# Patient Record
Sex: FEMALE | Race: ASIAN | Employment: STUDENT | URBAN - METROPOLITAN AREA
[De-identification: names, ages, dates, MRNs, and addresses within clinical notes are randomized per-mention and may not be internally consistent; named-entity substitution may affect disease eponyms.]

---

## 2018-06-06 ENCOUNTER — TRANSFERRED RECORDS (OUTPATIENT)
Dept: HEALTH INFORMATION MANAGEMENT | Facility: CLINIC | Age: 18
End: 2018-06-06

## 2018-06-06 LAB
C TRACH DNA SPEC QL PROBE+SIG AMP: NORMAL
N GONORRHOEA DNA SPEC QL PROBE+SIG AMP: NORMAL
SPECIMEN DESCRIP: NORMAL
SPECIMEN DESCRIPTION: NORMAL

## 2018-08-16 ENCOUNTER — OFFICE VISIT (OUTPATIENT)
Dept: FAMILY MEDICINE | Facility: CLINIC | Age: 18
End: 2018-08-16
Payer: COMMERCIAL

## 2018-08-16 VITALS
SYSTOLIC BLOOD PRESSURE: 102 MMHG | HEART RATE: 80 BPM | OXYGEN SATURATION: 99 % | WEIGHT: 110 LBS | HEIGHT: 61 IN | TEMPERATURE: 99.3 F | DIASTOLIC BLOOD PRESSURE: 70 MMHG | RESPIRATION RATE: 14 BRPM | BODY MASS INDEX: 20.77 KG/M2

## 2018-08-16 DIAGNOSIS — Z30.011 ENCOUNTER FOR BCP (BIRTH CONTROL PILLS) INITIAL PRESCRIPTION: Primary | ICD-10-CM

## 2018-08-16 PROCEDURE — 99203 OFFICE O/P NEW LOW 30 MIN: CPT | Performed by: INTERNAL MEDICINE

## 2018-08-16 RX ORDER — CETIRIZINE HYDROCHLORIDE 10 MG/1
10 TABLET ORAL
COMMUNITY
Start: 2018-06-06

## 2018-08-16 RX ORDER — NORETHINDRONE ACETATE AND ETHINYL ESTRADIOL 1MG-20(21)
1 KIT ORAL DAILY
Qty: 84 TABLET | Refills: 3 | Status: SHIPPED | OUTPATIENT
Start: 2018-08-16 | End: 2019-07-19

## 2018-08-16 NOTE — MR AVS SNAPSHOT
"              After Visit Summary   8/16/2018    Lázaro Nelson    MRN: [id number]           Patient Information     Date Of Birth          2000        Visit Information        Provider Department      8/16/2018 5:20 PM Cnidy Villa MD [facility] [place]        Today's Diagnoses     Encounter for BCP (birth control pills) initial prescription    -  1    Screening examination for venereal disease        Screening for HIV (human immunodeficiency virus)        Need for HPV vaccine        Need for prophylactic vaccination with tetanus-diphtheria (TD)           Follow-ups after your visit        Follow-up notes from your care team     Return in about 1 year (around 8/16/2019) for Physical Exam.      Who to contact     If you have questions or need follow up information about today's clinic visit or your schedule please contact [facility] [place] directly at [phone].  Normal or non-critical lab and imaging results will be communicated to you by MyChart, letter or phone within 4 business days after the clinic has received the results. If you do not hear from us within 7 days, please contact the clinic through MyChart or phone. If you have a critical or abnormal lab result, we will notify you by phone as soon as possible.  Submit refill requests through [employer] or call your pharmacy and they will forward the refill request to us. Please allow 3 business days for your refill to be completed.          Additional Information About Your Visit        MyChart Information     [employer] lets you send messages to your doctor, view your test results, renew your prescriptions, schedule appointments and more. To sign up, go to [email or web address] . Click on \"Log in\" on the left side of the screen, which will take you to the Welcome page. Then click on \"Sign up Now\" on the right side of the page.     You will be asked to enter the access code listed below, as well as some personal information. " "Please follow the directions to create your username and password.     Your access code is: [email or web address]  Expires: 2018  5:11 PM     Your access code will  in 90 days. If you need help or a new code, please call your [facility] clinic or [phone].        Care EveryWhere ID     This is your Care EveryWhere ID. This could be used by other organizations to access your [facility] medical records  [id number]        Your Vitals Were     Pulse Temperature Respirations Height Last Period Pulse Oximetry    80 99.3  F (37.4  C) (Tympanic) 14 5' 1\" (1.549 m) 2018 (Exact Date) 99%    BMI (Body Mass Index)                   20.78 kg/m2            Blood Pressure from Last 3 Encounters:   18 102/70    Weight from Last 3 Encounters:   18 110 lb (49.9 kg)              Today, you had the following     No orders found for display         Today's Medication Changes          These changes are accurate as of 18  5:30 PM.  If you have any questions, ask your nurse or doctor.               Start taking these medicines.        Dose/Directions    norethindrone-ethinyl estradiol 1-20 MG-MCG per tablet   Commonly known as:  JUNEL FE 1/20   Used for:  Encounter for BCP (birth control pills) initial prescription   Started by:  Cindy Villa MD        Dose:  1 tablet   Take 1 tablet by mouth daily   Quantity:  84 tablet   Refills:  3            Where to get your medicines      These medications were sent to [id number] - [place], [place] - [address] AT [facility] [place] [facility] [address]  [address], [place] [address]    Hours:  24-hours Phone:  [phone]     norethindrone-ethinyl estradiol 1-20 MG-MCG per tablet                Primary Care Provider Fax #    Physician No Ref-Primary [phone]       No address on file        Equal Access to Services     RON RUTHERFORD AH: Coretta doniso Soomaali, waaxda luqadaha, qaybta kaalmada aditya, jensen napoles " aric raya ah. So [facility] [phone].    ATENCIÓN: Si habla kimberly, tiene a brown disposición servicios gratuitos de asistencia lingüística. Marshal al [phone].    We comply with applicable federal civil rights laws and [place] laws. We do not discriminate on the basis of race, color, national origin, age, disability, sex, sexual orientation, or gender identity.            Thank you!     Thank you for choosing [facility] [place]  for your care. Our goal is always to provide you with excellent care. Hearing back from our patients is one way we can continue to improve our services. Please take a few minutes to complete the written survey that you may receive in the mail after your visit with us. Thank you!             Your Updated Medication List - Protect others around you: Learn how to safely use, store and throw away your medicines at www.disposemymeds.org.          This list is accurate as of 8/16/18  5:30 PM.  Always use your most recent med list.                   Brand Name Dispense Instructions for use Diagnosis    cetirizine 10 MG tablet    zyrTEC     Take 10 mg by mouth        norethindrone-ethinyl estradiol 1-20 MG-MCG per tablet    JUNEL FE 1/20    84 tablet    Take 1 tablet by mouth daily    Encounter for BCP (birth control pills) initial prescription

## 2018-08-16 NOTE — PROGRESS NOTES
"  SUBJECTIVE:   Lázaro Nelson is a 18 year old female who presents to clinic today for the following health issues:      Contracepton        Description (location/character/radiation): Pt would like to discuss contraception options.        Lázaro birth control.  She is interested in the pill.  She feels like she would not have trouble remembering to take it every day.  She is currently sexually active, using condoms.  She had STD screening 2 months ago at [facility] which was negative.  She is having regular periods.     She has no history of migraines, she does not smoke, her blood pressure is normal.  There is no known personal or family history of VTE, heart attack or stroke.    She is heading back to college next week.  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  She is otherwise healthy.  Takes cetirizine for allergies.    Reviewed and updated as needed this visit by clinical staff  Tobacco  Meds  Soc Hx      Reviewed and updated as needed this visit by Provider  Meds         ROS:  Cv, neuro, gu reviewed,  otherwise negative unless noted above.       OBJECTIVE:     /70 (Cuff Size: Adult Regular)  Pulse 80  Temp 99.3  F (37.4  C) (Tympanic)  Resp 14  Ht 5' 1\" (1.549 m)  Wt 110 lb (49.9 kg)  LMP 07/26/2018 (Exact Date)  SpO2 99%  BMI 20.78 kg/m2  Body mass index is 20.78 kg/(m^2).    GENERAL: healthy, alert and no distress  PSYCH: mentation appears normal, affect normal/bright        ASSESSMENT/PLAN:       1. Encounter for BCP (birth control pills) initial prescription  Discussed options of nuvaring, depo shot, and nexplanon as well.  She would like to do OCP.  Reviewed common side effects.  Stressed that condoms are still important to prevent STDs, OCP only prevents pregnancy.  Effectiveness declines with inconsistency.  Recommended she start at her next menstrual cycle.  - norethindrone-ethinyl estradiol (JUNEL FE 1/20) 1-20 MG-MCG per tablet; Take 1 tablet by mouth daily  Dispense: 84 tablet; " Refill: 3    F/U - annually, sooner as needed    Cindy Villa MD  [facility]

## 2018-09-24 ENCOUNTER — NURSE TRIAGE (OUTPATIENT)
Dept: NURSING | Facility: CLINIC | Age: 18
End: 2018-09-24

## 2018-09-24 NOTE — TELEPHONE ENCOUNTER
"Patient completed her first pack of birth control pills.  She has bloating. She is sexually active and they've used back up contraceptive to cover while the pills take effect.  She is going to go to the school clinic for an evaluation to make sure she's not pregnant and any further guidance. She didn't have regular flow just spotting.  Yissel Curtis RN-[facility]      Additional Information    Negative: [1] Vaginal bleeding AND [2] not on birth control pills    Negative: Vaginal discharge (other than bleeding) is main symptom    Negative: [1] SEVERE pain (e.g., excruciating) AND [2] present > 1 hour    Negative: [1] Pregnant AND [2] MODERATE-SEVERE abdominal pain    Negative: [1] Pregnant AND [2] MODERATE-SEVERE vaginal bleeding    Negative: SEVERE vaginal bleeding (i.e., soaking 2 pads or tampons per hour and present 2 or more hours)    Negative: Patient sounds very sick or weak to the triager    Negative: [1] Constant abdominal pain AND [2] present > 2 hours    Negative: DVT suspected (teen with unilateral painful thigh or calf AND edema distal to pain)    Negative: [1] Pregnant AND [2] MILD symptoms (e.g., vaginal spotting, mild abdominal cramping)    Negative: Caller has urgent question and triager unable to answer question    Negative: MODERATE vaginal bleeding (i.e., soaking 1 pad or tampon per hour and present > 6 hours)    Negative: Caller requests refill for birth control pills    Negative: Caller has NON-URGENT question and triager unable to answer question    Negative: [1] Recent unprotected sex (within last 5 days) AND [2] history of missed pills/inconsistent pill use    Negative: [1] Pregnant AND [2] NO vaginal bleeding or abdominal pain    Negative: [1] Vaginal bleeding with > 6 soaked pads or tampons per day AND [2] lasts > 7 days    Negative: Caller is concerned about a sexually transmitted infection (STI)    Negative: Missed 3 or more \"active\" pills in a cycle    Negative: [1] " Vaginal bleeding with > 6 soaked pads or tampons per day BUT [2] lasts 7 days or less    Negative: Vaginal bleeding lasts > 7 days    Negative: [1] No monthly bleeding AND [2] stopped taking birth control pills 6 or more months ago    Negative: [1] Bleeding or spotting between regular periods AND [2] continues more than 3 months on birth control pills    Negative: [1] No monthly bleeding AND [2] taking progestin-only birth control pills (all triage questions negative)    Negative: [1] No monthly bleeding (or irregular periods) AND [2] stopped taking birth control pills < 6 months ago (all triage questions negative)    Negative: [1] Irregular vaginal bleeding or spotting AND [2] taking birth control pills with NO missed doses (all triage questions negative)    Negative: [1] Irregular vaginal bleeding or spotting AND [2] taking birth control pills and has missed doses (all triage questions negative)    Negative: Missed 1 or more pills, questions about (all triage questions negative)    Birth control pills, questions about    Protocols used: CONTRACEPTION - BIRTH CONTROL PILLS-PEDIATRIC-

## 2019-04-22 ENCOUNTER — ALLIED HEALTH/NURSE VISIT (OUTPATIENT)
Dept: NURSING | Facility: CLINIC | Age: 19
End: 2019-04-22
Payer: COMMERCIAL

## 2019-04-22 DIAGNOSIS — Z23 NEED FOR TDAP VACCINATION: Primary | ICD-10-CM

## 2019-04-22 PROCEDURE — 99207 ZZC NO CHARGE NURSE ONLY: CPT

## 2019-04-22 PROCEDURE — 90471 IMMUNIZATION ADMIN: CPT

## 2019-04-22 PROCEDURE — 90715 TDAP VACCINE 7 YRS/> IM: CPT

## 2019-05-16 ENCOUNTER — HEALTH MAINTENANCE LETTER (OUTPATIENT)
Age: 19
End: 2019-05-16

## 2019-07-15 ENCOUNTER — ALLIED HEALTH/NURSE VISIT (OUTPATIENT)
Dept: NURSING | Facility: CLINIC | Age: 19
End: 2019-07-15
Payer: COMMERCIAL

## 2019-07-15 DIAGNOSIS — Z11.1 VISIT FOR MANTOUX TEST: Primary | ICD-10-CM

## 2019-07-15 PROCEDURE — 86580 TB INTRADERMAL TEST: CPT

## 2019-07-15 NOTE — PROGRESS NOTES

## 2019-07-17 ENCOUNTER — ALLIED HEALTH/NURSE VISIT (OUTPATIENT)
Dept: NURSING | Facility: CLINIC | Age: 19
End: 2019-07-17
Payer: COMMERCIAL

## 2019-07-17 DIAGNOSIS — Z11.1 SCREENING EXAMINATION FOR PULMONARY TUBERCULOSIS: Primary | ICD-10-CM

## 2019-07-17 LAB
PPDINDURATION: 0 MM (ref 0–5)
PPDREDNESS: 0 MM

## 2019-07-17 PROCEDURE — 99207 ZZC NO CHARGE NURSE ONLY: CPT

## 2019-07-22 ENCOUNTER — ALLIED HEALTH/NURSE VISIT (OUTPATIENT)
Dept: NURSING | Facility: CLINIC | Age: 19
End: 2019-07-22
Payer: COMMERCIAL

## 2019-07-22 DIAGNOSIS — Z11.1 VISIT FOR MANTOUX TEST: Primary | ICD-10-CM

## 2019-07-22 PROCEDURE — 86580 TB INTRADERMAL TEST: CPT

## 2019-07-22 NOTE — PROGRESS NOTES
The patient is asked the following questions today and these are her answers:    -Have you had a mantoux administered in the past 30 days?    Yes  -Have you had a previous positive Mantoux.  No  -Have you received BCG in the past.  No  -Have you had a live vaccine  (MMR, Varicella, OPV, Yellow Fever) in the last 6 weeks.  No  -Have you had and active  viral or bacterial infection in the past 6 weeks.  No  -Have you received corticosteroids or immunosuppressive agents in the past 6 weeks.  No  -Have you been diagnosed with HIV?  No  -Do you have a malignancy?  No    Mantoux Questionnaire: answers were all negative.    Lisa Silva CMA

## 2019-07-24 ENCOUNTER — OFFICE VISIT (OUTPATIENT)
Dept: FAMILY MEDICINE | Facility: CLINIC | Age: 19
End: 2019-07-24
Payer: COMMERCIAL

## 2019-07-24 ENCOUNTER — ALLIED HEALTH/NURSE VISIT (OUTPATIENT)
Dept: NURSING | Facility: CLINIC | Age: 19
End: 2019-07-24
Payer: COMMERCIAL

## 2019-07-24 VITALS
HEART RATE: 71 BPM | HEIGHT: 61 IN | WEIGHT: 114 LBS | SYSTOLIC BLOOD PRESSURE: 102 MMHG | BODY MASS INDEX: 21.52 KG/M2 | TEMPERATURE: 97.9 F | DIASTOLIC BLOOD PRESSURE: 72 MMHG

## 2019-07-24 DIAGNOSIS — Z00.00 ROUTINE GENERAL MEDICAL EXAMINATION AT A HEALTH CARE FACILITY: ICD-10-CM

## 2019-07-24 DIAGNOSIS — Z11.1 SCREENING EXAMINATION FOR PULMONARY TUBERCULOSIS: Primary | ICD-10-CM

## 2019-07-24 DIAGNOSIS — Z12.4 SCREENING FOR MALIGNANT NEOPLASM OF CERVIX: ICD-10-CM

## 2019-07-24 PROBLEM — Z30.41 USES ORAL CONTRACEPTION: Status: ACTIVE | Noted: 2019-07-24

## 2019-07-24 PROBLEM — R71.8 MICROCYTOSIS: Status: ACTIVE | Noted: 2018-06-06

## 2019-07-24 LAB
PPDINDURATION: 0 MM (ref 0–5)
PPDREDNESS: 0 MM

## 2019-07-24 PROCEDURE — 99207 ZZC NO CHARGE NURSE ONLY: CPT

## 2019-07-24 PROCEDURE — G0123 SCREEN CERV/VAG THIN LAYER: HCPCS | Performed by: INTERNAL MEDICINE

## 2019-07-24 PROCEDURE — 99395 PREV VISIT EST AGE 18-39: CPT | Performed by: INTERNAL MEDICINE

## 2019-07-24 RX ORDER — NORETHINDRONE ACETATE AND ETHINYL ESTRADIOL 1MG-20(21)
1 KIT ORAL DAILY
Qty: 84 TABLET | Refills: 4 | Status: SHIPPED | OUTPATIENT
Start: 2019-07-24 | End: 2020-08-17

## 2019-07-24 ASSESSMENT — MIFFLIN-ST. JEOR: SCORE: 1219.48

## 2019-07-24 NOTE — LETTER
July 30, 2019      Lázaro Nelson  [address]  [place] [address]    Dear ,      I am happy to inform you that your recent cervical cancer screening test (PAP smear) was normal.      Preventative screenings such as this help to ensure your health for years to come. You should repeat a pap smear in 3 years, unless otherwise directed.      You will still need to return to the clinic every year for your annual exam and other preventive tests.     If you have additional questions regarding this result, please call our registered nurse, Ewelina at [phone].      Sincerely,      Cindy Villa MD/jose

## 2019-07-24 NOTE — PROGRESS NOTES
SUBJECTIVE:   CC: Lázaro Nelson is an 19 year old woman who presents for preventive health visit.     Lives with mom, sister, step dad.  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  Going to [place] next month for about 3 weeks.     Healthy Habits:    Do you get at least three servings of calcium containing foods daily (dairy, green leafy vegetables, etc.)? yes    Amount of exercise or daily activities, outside of work: 3 day(s) per week    Problems taking medications regularly No    Medication side effects: No    Have you had an eye exam in the past two years? no    Do you see a dentist twice per year? yes    Do you have sleep apnea, excessive snoring or daytime drowsiness?no        Today's PHQ-2 Score:   PHQ-2 ( 1999 [employer]) 7/24/2019 8/16/2018   Q1: Little interest or pleasure in doing things 0 0   Q2: Feeling down, depressed or hopeless 0 0   PHQ-2 Score 0 0       Abuse: Current or Past(Physical, Sexual or Emotional)- NO  Do you feel safe in your environment? YES    Social History     Tobacco Use     Smoking status: Never Smoker     Smokeless tobacco: Never Used   Substance Use Topics     Alcohol use: Yes     If you drink alcohol do you typically have >3 drinks per day or >7 drinks per week? No                     Reviewed orders with patient.  Reviewed health maintenance and updated orders accordingly - Yes  Patient Active Problem List   Diagnosis     Microcytosis     Uses oral contraception     History reviewed. No pertinent surgical history.    Social History     Tobacco Use     Smoking status: Never Smoker     Smokeless tobacco: Never Used   Substance Use Topics     Alcohol use: Yes     Family History   Problem Relation Age of Onset     Diabetes Maternal Grandfather          Current Outpatient Medications   Medication Sig Dispense Refill     cetirizine (ZYRTEC) 10 MG tablet Take 10 mg by mouth       norethindrone-ethinyl estradiol (JUNEL FE  "1/20) 1-20 MG-MCG tablet Take 1 tablet by mouth daily 84 tablet 4       Mammogram not appropriate for this patient based on age.    Pertinent mammograms are reviewed under the imaging tab.  History of abnormal Pap smear: NO - age 21-29 PAP every 3 years recommended     Reviewed and updated as needed this visit by clinical staff  Tobacco  Allergies  Meds  Med Hx  Surg Hx  Fam Hx  Soc Hx        Reviewed and updated as needed this visit by Provider  Tobacco  Meds  Med Hx  Surg Hx  Fam Hx  Soc Hx           ROS:  CONSTITUTIONAL: NEGATIVE for fever, chills, change in weight  INTEGUMENTARU/SKIN: NEGATIVE for worrisome rashes, moles or lesions  EYES: NEGATIVE for vision changes or irritation  ENT: NEGATIVE for ear, mouth and throat problems  RESP: NEGATIVE for significant cough or SOB  BREAST: NEGATIVE for masses, tenderness or discharge  CV: NEGATIVE for chest pain, palpitations or peripheral edema  GI: NEGATIVE for nausea, abdominal pain, heartburn, or change in bowel habits  : NEGATIVE for unusual urinary or vaginal symptoms. Periods are regular.  MUSCULOSKELETAL: NEGATIVE for significant arthralgias or myalgia  NEURO: NEGATIVE for weakness, dizziness or paresthesias  PSYCHIATRIC: NEGATIVE for changes in mood or affect    OBJECTIVE:   /72 (BP Location: Right arm, Patient Position: Chair, Cuff Size: Adult Regular)   Pulse 71   Temp 97.9  F (36.6  C) (Tympanic)   Ht 1.549 m (5' 1\")   Wt 51.7 kg (114 lb)   LMP 07/12/2019   BMI 21.54 kg/m    EXAM:  GENERAL: healthy, alert and no distress  EYES: Eyes grossly normal to inspection, PERRL and conjunctivae and sclerae normal  HENT: ear canals and TM's normal, mouth without ulcers or lesions  NECK: no adenopathy  RESP: lungs clear to auscultation - no rales, rhonchi or wheezes  CV: regular rate and rhythm, normal S1 S2, no S3 or S4, no murmur, click or rub, no peripheral edema and peripheral pulses strong  ABDOMEN: soft, nontender, no hepatosplenomegaly, " "no masses and bowel sounds normal   (female): normal female external genitalia, normal urethral meatus, vaginal mucosa pink, moist, well rugated, and normal cervix  MS: no gross musculoskeletal defects noted, no edema  SKIN: no suspicious lesions or rashes  NEURO: Normal strength and tone, mentation intact and speech normal  PSYCH: mentation appears normal, affect normal/bright        ASSESSMENT/PLAN:   1. Routine general medical examination at a health care facility  Healthy 19 year old.     2. Screening for malignant neoplasm of cervix  - Pap imaged thin layer screen only - recommended age 21 - 24 years    3. Birth control  Taking regularly, also using condoms. Declines need for STD testing.   - norethindrone-ethinyl estradiol (JUNEL FE 1/20) 1-20 MG-MCG tablet; Take 1 tablet by mouth daily  Dispense: 84 tablet; Refill: 4    COUNSELING:   Reviewed preventive health counseling, as reflected in patient instructions  Special attention given to:        Regular exercise       Healthy diet/nutrition       Osteoporosis Prevention/Bone Health       Safe sex practices/STD prevention    Estimated body mass index is 21.54 kg/m  as calculated from the following:    Height as of this encounter: 1.549 m (5' 1\").    Weight as of this encounter: 51.7 kg (114 lb).         reports that she has never smoked. She has never used smokeless tobacco.      Counseling Resources:  ATP IV Guidelines  Pooled Cohorts Equation Calculator  Breast Cancer Risk Calculator  FRAX Risk Assessment  ICSI Preventive Guidelines  Dietary Guidelines for Americans, 2010  USDA's MyPlate  ASA Prophylaxis  Lung CA Screening    Cindy Villa MD  [facility]  "

## 2019-07-24 NOTE — NURSING NOTE
Mantoux result:  Lab Results   Component Value Date    PPDREDNESS 0 07/24/2019    PPDINDURATIO 0 07/24/2019     Results printed for the patient.  Alison Gatica RN

## 2019-07-29 LAB
COPATH REPORT: NORMAL
PAP: NORMAL

## 2020-03-11 ENCOUNTER — HEALTH MAINTENANCE LETTER (OUTPATIENT)
Age: 20
End: 2020-03-11

## 2020-08-04 DIAGNOSIS — Z78.9 USES BIRTH CONTROL: ICD-10-CM

## 2020-08-04 NOTE — TELEPHONE ENCOUNTER
Patient is overdue for office visit/virtual visit. Last office visit was July of 2019. Routing to team to inform and assist with scheduling virtual visit with provider. Once patient is scheduled for visit, route back to triage to address refill. Thank you very much.    Elida Rubi RN, BSN  [facility]

## 2020-08-06 ENCOUNTER — MYC MEDICAL ADVICE (OUTPATIENT)
Dept: FAMILY MEDICINE | Facility: CLINIC | Age: 20
End: 2020-08-06

## 2020-08-06 DIAGNOSIS — Z30.41 USES ORAL CONTRACEPTION: Primary | ICD-10-CM

## 2020-08-17 RX ORDER — NORETHINDRONE ACETATE AND ETHINYL ESTRADIOL 1MG-20(21)
1 KIT ORAL DAILY
Qty: 84 TABLET | Refills: 0 | Status: SHIPPED | OUTPATIENT
Start: 2020-08-17

## 2020-08-17 NOTE — TELEPHONE ENCOUNTER
3 attempts made to reach pt but unable, sent letter to pt to make an appointment       Kelly Velásquez MA    Treatment Number: 39

## 2020-08-18 NOTE — TELEPHONE ENCOUNTER
Her pharmacy listed is in [place].  3 months ordered to get her to new PCP in [place].     Cindy Villa MD

## 2020-08-19 RX ORDER — NORETHINDRONE ACETATE AND ETHINYL ESTRADIOL AND FERROUS FUMARATE 1MG-20(21)
KIT ORAL
Qty: 84 TABLET | Refills: 4 | OUTPATIENT
Start: 2020-08-19

## 2020-08-19 NOTE — TELEPHONE ENCOUNTER
Team unable to reach patient- triage unabel to refill- routing to provider to address refill request

## 2020-08-24 DIAGNOSIS — Z30.41 USES ORAL CONTRACEPTION: ICD-10-CM

## 2020-08-24 NOTE — TELEPHONE ENCOUNTER
28 day med on back order. May we have Rx for 21 day and skip 7 days? Please call or send new Rx. Thanks!!

## 2020-08-25 RX ORDER — NORETHINDRONE ACETATE AND ETHINYL ESTRADIOL 1MG-20(21)
1 KIT ORAL DAILY
Qty: 84 TABLET | Refills: 0 | OUTPATIENT
Start: 2020-08-25

## 2021-01-03 ENCOUNTER — HEALTH MAINTENANCE LETTER (OUTPATIENT)
Age: 21
End: 2021-01-03

## 2021-10-10 ENCOUNTER — HEALTH MAINTENANCE LETTER (OUTPATIENT)
Age: 21
End: 2021-10-10

## 2022-01-29 ENCOUNTER — HEALTH MAINTENANCE LETTER (OUTPATIENT)
Age: 22
End: 2022-01-29

## 2022-09-18 ENCOUNTER — HEALTH MAINTENANCE LETTER (OUTPATIENT)
Age: 22
End: 2022-09-18

## 2023-05-07 ENCOUNTER — HEALTH MAINTENANCE LETTER (OUTPATIENT)
Age: 23
End: 2023-05-07